# Patient Record
Sex: FEMALE | Race: WHITE | Employment: FULL TIME | URBAN - METROPOLITAN AREA
[De-identification: names, ages, dates, MRNs, and addresses within clinical notes are randomized per-mention and may not be internally consistent; named-entity substitution may affect disease eponyms.]

---

## 2019-07-09 ENCOUNTER — HOSPITAL ENCOUNTER (OUTPATIENT)
Age: 34
Setting detail: SPECIMEN
Discharge: HOME | End: 2019-07-09
Payer: COMMERCIAL

## 2019-07-09 ENCOUNTER — OFFICE VISIT (OUTPATIENT)
Dept: OBGYN CLINIC | Age: 34
End: 2019-07-09
Payer: COMMERCIAL

## 2019-07-09 VITALS
WEIGHT: 232 LBS | HEIGHT: 67 IN | BODY MASS INDEX: 36.41 KG/M2 | DIASTOLIC BLOOD PRESSURE: 78 MMHG | SYSTOLIC BLOOD PRESSURE: 122 MMHG

## 2019-07-09 DIAGNOSIS — Z11.51 SCREENING FOR HPV (HUMAN PAPILLOMAVIRUS): ICD-10-CM

## 2019-07-09 DIAGNOSIS — Z11.3 SCREENING FOR STD (SEXUALLY TRANSMITTED DISEASE): ICD-10-CM

## 2019-07-09 DIAGNOSIS — Z98.890 H/O LEEP: ICD-10-CM

## 2019-07-09 DIAGNOSIS — Z01.419 WELL FEMALE EXAM WITH ROUTINE GYNECOLOGICAL EXAM: Primary | ICD-10-CM

## 2019-07-09 DIAGNOSIS — Z01.419 WELL FEMALE EXAM WITH ROUTINE GYNECOLOGICAL EXAM: ICD-10-CM

## 2019-07-09 LAB
DIRECT EXAM: NORMAL
Lab: NORMAL
SPECIMEN DESCRIPTION: NORMAL

## 2019-07-09 PROCEDURE — 87491 CHLMYD TRACH DNA AMP PROBE: CPT

## 2019-07-09 PROCEDURE — 99385 PREV VISIT NEW AGE 18-39: CPT | Performed by: NURSE PRACTITIONER

## 2019-07-09 PROCEDURE — 87591 N.GONORRHOEAE DNA AMP PROB: CPT

## 2019-07-09 PROCEDURE — G0145 SCR C/V CYTO,THINLAYER,RESCR: HCPCS

## 2019-07-09 PROCEDURE — 87480 CANDIDA DNA DIR PROBE: CPT

## 2019-07-09 PROCEDURE — 87510 GARDNER VAG DNA DIR PROBE: CPT

## 2019-07-09 PROCEDURE — 87624 HPV HI-RISK TYP POOLED RSLT: CPT

## 2019-07-09 PROCEDURE — 87660 TRICHOMONAS VAGIN DIR PROBE: CPT

## 2019-07-09 ASSESSMENT — PATIENT HEALTH QUESTIONNAIRE - PHQ9
SUM OF ALL RESPONSES TO PHQ QUESTIONS 1-9: 0
SUM OF ALL RESPONSES TO PHQ QUESTIONS 1-9: 0
1. LITTLE INTEREST OR PLEASURE IN DOING THINGS: 0
SUM OF ALL RESPONSES TO PHQ9 QUESTIONS 1 & 2: 0
2. FEELING DOWN, DEPRESSED OR HOPELESS: 0

## 2019-07-09 NOTE — PROGRESS NOTES
History and Physical  [address], [address] [facility] [address] [place] [address]. [phone]   Fax [phone]  Jana Duverney  2019              35 y.o. Chief Complaint   Patient presents with    Gynecologic Exam       Patient's last menstrual period was 2019. Primary Care Physician: Leland Bland    The patient was seen and examined. She has no chief complaint today and is here for her annual exam. Patient desires STD check- found out  was unfaithful. No longer with . Denies burning, vaginal discharge and odor. Patient declines herpes profile. Tested positive in the past.  Her bowels are regular. There are no voiding complaints. She denies any bloating. She denies vaginal discharge and was counseled on STD's and the need for barrier contraception. HPI : Jana Duverney is a 35 y.o. female [id number]    Annual exam  STD check  ________________________________________________________________________  OB History    Para Term  AB Living   3 3 3 0 0 3   SAB TAB Ectopic Molar Multiple Live Births   0 0 0 0 0 3      # Outcome Date GA Lbr Ron/2nd Weight Sex Delivery Anes PTL Lv   3 Term 14 37w3d  6 lb 11.6 oz (3.05 kg) M Vag-Spont  N ELISE      Birth Comments: Education information given to mother and she verbalizes understanding about the following:      Infant security. Patient safety. Skin to Skin Contact for You and Your Baby. Hour for family beginnings. Benefits of breastfeeding.     What do the expe      Name: Tiny   2 Term 13 38w0d  7 lb 15 oz (3.6 kg) F Vag-Spont  N ELISE      Name: Bryant Giraldon: 9  Apgar5: 9   1 Term 08 40w0d  7 lb 8.3 oz (3.41 kg) M Vag-Spont EPI N ELISE      Name: Dione Card: 9  Apgar5: 9     Past Medical History:   Diagnosis Date    Anemia 2013    Depression Presently on Prozac 2013    HSV (herpes simplex virus) infection     HSV (herpes simplex virus) infection     Obesity complicating pregnancy, childbirth, or the puerperium, antepartum condition or [id number] [id number]                                                                   Past Surgical History:   Procedure Laterality Date    ANTERIOR CRUCIATE LIGAMENT REPAIR  2008    LT REPLACED    KNEE ARTHROSCOPY      WISDOM TOOTH EXTRACTION       Family History   Problem Relation Age of Onset    Uterine Cancer Maternal Grandmother     Hypertension Father     Asthma Father     Heart Attack Maternal Grandfather     Breast Cancer Neg Hx     Cancer Neg Hx     Colon Cancer Neg Hx     Diabetes Neg Hx     Eclampsia Neg Hx     Ovarian Cancer Neg Hx      Labor Neg Hx     Spont Abortions Neg Hx     Stroke Neg Hx      Social History     Socioeconomic History    Marital status:      Spouse name: Not on file    Number of children: Not on file    Years of education: Not on file    Highest education level: Not on file   Occupational History    Not on file   Social Needs    Financial resource strain: Not on file    Food insecurity:     Worry: Not on file     Inability: Not on file    Transportation needs:     Medical: Not on file     Non-medical: Not on file   Tobacco Use    Smoking status: Current Every Day Smoker    Smokeless tobacco: Never Used   Substance and Sexual Activity    Alcohol use:  Yes    Drug use: No    Sexual activity: Yes     Partners: Male   Lifestyle    Physical activity:     Days per week: Not on file     Minutes per session: Not on file    Stress: Not on file   Relationships    Social connections:     Talks on phone: Not on file     Gets together: Not on file     Attends Scientologist service: Not on file     Active member of club or organization: Not on file     Attends meetings of clubs or organizations: Not on file     Relationship status: Not on file    Intimate partner violence:     Fear of current or ex partner: Not on file     Emotionally abused: Not on file     Physically abused: Not on file     Forced sexual activity: Not on file   Other Topics Concern    Not on file   Social History Narrative    Not on file       MEDICATIONS:  Current Outpatient Medications   Medication Sig Dispense Refill    omeprazole (PRILOSEC) 20 MG capsule       Multiple Vitamin (MULTI VITAMIN DAILY PO) Take by mouth      b complex vitamins capsule Take 1 capsule by mouth daily      LORazepam (ATIVAN) 1 MG tablet Take 1 tablet by mouth every 8 hours as needed for Anxiety 10 tablet 0    DiphenhydrAMINE HCl (BENADRYL PO) Take  by mouth.  amphetamine-dextroamphetamine (ADDERALL) 20 MG tablet       Cholecalciferol (VITAMIN D) 2000 UNITS CAPS capsule Take by mouth      fexofenadine (ALLEGRA) 60 MG tablet Take 60 mg by mouth daily      norethindrone-ethinyl estradiol-iron (LOESTRIN FE 1.5/30) 1.5-30 MG-MCG tablet Take 1 tablet by mouth daily 1 packet 12    FLUoxetine (PROZAC) 20 MG capsule Take 40 mg by mouth daily. No current facility-administered medications for this visit. ALLERGIES:  Allergies as of 07/09/2019    (No Known Allergies)       Symptoms of decreased mood absent  Symptoms of anhedonia absent    **If either question is answered in a  positive fashion then complete the PHQ9 Scoring Evaluation and make the appropriate referral**      Immunization status: stated as current, but no records available. Gynecologic History:  Menarche: 5 yo  Menopause at [address] [facility] yo     Patient's last menstrual period was 06/18/2019. Sexually Active: Yes    STD History: Yes hx herpes      Permanent Sterilization: No   Reversible Birth Control: No        Hormone Replacement Exposure: No      Genetic Qualified Family History of Breast, Ovarian , Colon or Uterine Cancer: no     If YES see scanned worksheet.     Preventative Health Testing:    Health Maintenance:  Health Maintenance Due   Topic Date Due    Pneumococcal 0-64 years Vaccine (1 of 1 - PPSV23) Future     Standing Expiration Date:   8/8/2019    HIV Screen     Standing Status:   Future     Standing Expiration Date:   7/9/2020    Hepatitis C Antibody     Standing Status:   Future     Standing Expiration Date:   7/9/2020

## 2019-07-10 LAB
C TRACH DNA GENITAL QL NAA+PROBE: NEGATIVE
N. GONORRHOEAE DNA: NEGATIVE
SPECIMEN DESCRIPTION: NORMAL

## 2019-07-11 LAB
CYTOLOGY REPORT: NORMAL
HPV SAMPLE: NORMAL
HPV, GENOTYPE 16: NOT DETECTED
HPV, GENOTYPE 18: NOT DETECTED
HPV, HIGH RISK OTHER: NOT DETECTED
HPV, INTERPRETATION: NORMAL
SPECIMEN DESCRIPTION: NORMAL

## 2019-11-26 ENCOUNTER — TELEPHONE (OUTPATIENT)
Dept: OBGYN CLINIC | Age: 34
End: 2019-11-26

## 2019-11-26 DIAGNOSIS — R10.2 PELVIC PAIN IN FEMALE: Primary | ICD-10-CM

## 2019-11-26 RX ORDER — VALACYCLOVIR HYDROCHLORIDE 500 MG/1
500 TABLET, FILM COATED ORAL 2 TIMES DAILY
Qty: 30 TABLET | Refills: 0 | Status: SHIPPED | OUTPATIENT
Start: 2019-11-26 | End: 2019-11-29

## 2019-11-27 ENCOUNTER — OFFICE VISIT (OUTPATIENT)
Dept: OBGYN CLINIC | Age: 34
End: 2019-11-27
Payer: COMMERCIAL

## 2019-11-27 DIAGNOSIS — R10.2 PELVIC PAIN IN FEMALE: ICD-10-CM

## 2019-11-27 PROCEDURE — 76856 US EXAM PELVIC COMPLETE: CPT | Performed by: OBSTETRICS & GYNECOLOGY

## 2019-11-27 PROCEDURE — 76830 TRANSVAGINAL US NON-OB: CPT | Performed by: OBSTETRICS & GYNECOLOGY

## 2019-12-02 ENCOUNTER — TELEPHONE (OUTPATIENT)
Dept: OBGYN CLINIC | Age: 34
End: 2019-12-02

## 2019-12-02 DIAGNOSIS — N83.202 LEFT OVARIAN CYST: Primary | ICD-10-CM

## 2021-09-03 ENCOUNTER — HOSPITAL ENCOUNTER (OUTPATIENT)
Age: 36
Setting detail: SPECIMEN
Discharge: HOME | End: 2021-09-03
Payer: COMMERCIAL

## 2021-09-03 LAB
ABSOLUTE EOS #: 0.19 K/UL (ref 0–0.44)
ABSOLUTE IMMATURE GRANULOCYTE: <0.03 K/UL (ref 0–0.3)
ABSOLUTE LYMPH #: 2.4 K/UL (ref 1.1–3.7)
ABSOLUTE MONO #: 0.69 K/UL (ref 0.1–1.2)
ALBUMIN SERPL-MCNC: 4.6 G/DL (ref 3.5–5.2)
ALBUMIN/GLOBULIN RATIO: 1.6 (ref 1–2.5)
ALP BLD-CCNC: 66 U/L (ref 35–104)
ALT SERPL-CCNC: 30 U/L (ref 5–33)
ANION GAP SERPL CALCULATED.3IONS-SCNC: 12 MMOL/L (ref 9–17)
AST SERPL-CCNC: 11 U/L
BASOPHILS # BLD: 0 % (ref 0–2)
BASOPHILS ABSOLUTE: 0.03 K/UL (ref 0–0.2)
BILIRUB SERPL-MCNC: 0.6 MG/DL (ref 0.3–1.2)
BUN BLDV-MCNC: 12 MG/DL (ref 6–20)
BUN/CREAT BLD: ABNORMAL (ref 9–20)
CALCIUM SERPL-MCNC: 9.4 MG/DL (ref 8.6–10.4)
CHLORIDE BLD-SCNC: 103 MMOL/L (ref 98–107)
CHOLESTEROL, FASTING: 173 MG/DL
CHOLESTEROL/HDL RATIO: 3.6
CO2: 23 MMOL/L (ref 20–31)
CREAT SERPL-MCNC: 0.65 MG/DL (ref 0.5–0.9)
DIFFERENTIAL TYPE: NORMAL
EOSINOPHILS RELATIVE PERCENT: 2 % (ref 1–4)
GFR AFRICAN AMERICAN: >60 ML/MIN
GFR NON-AFRICAN AMERICAN: >60 ML/MIN
GFR SERPL CREATININE-BSD FRML MDRD: ABNORMAL ML/MIN/{1.73_M2}
GFR SERPL CREATININE-BSD FRML MDRD: ABNORMAL ML/MIN/{1.73_M2}
GLUCOSE BLD-MCNC: 103 MG/DL (ref 70–99)
HCT VFR BLD CALC: 41.3 % (ref 36.3–47.1)
HDLC SERPL-MCNC: 48 MG/DL
HEMOGLOBIN: 13.6 G/DL (ref 11.9–15.1)
IMMATURE GRANULOCYTES: 0 %
LDL CHOLESTEROL: 107 MG/DL (ref 0–130)
LYMPHOCYTES # BLD: 29 % (ref 24–43)
MCH RBC QN AUTO: 30.2 PG (ref 25.2–33.5)
MCHC RBC AUTO-ENTMCNC: 32.9 G/DL (ref 28.4–34.8)
MCV RBC AUTO: 91.8 FL (ref 82.6–102.9)
MONOCYTES # BLD: 8 % (ref 3–12)
NRBC AUTOMATED: 0 PER 100 WBC
PDW BLD-RTO: 12.6 % (ref 11.8–14.4)
PLATELET # BLD: 391 K/UL (ref 138–453)
PLATELET ESTIMATE: NORMAL
PMV BLD AUTO: 9.2 FL (ref 8.1–13.5)
POTASSIUM SERPL-SCNC: 4.1 MMOL/L (ref 3.7–5.3)
RBC # BLD: 4.5 M/UL (ref 3.95–5.11)
RBC # BLD: NORMAL 10*6/UL
SEG NEUTROPHILS: 61 % (ref 36–65)
SEGMENTED NEUTROPHILS ABSOLUTE COUNT: 4.85 K/UL (ref 1.5–8.1)
SODIUM BLD-SCNC: 138 MMOL/L (ref 135–144)
THYROXINE, FREE: 1.22 NG/DL (ref 0.93–1.7)
TOTAL PROTEIN: 7.5 G/DL (ref 6.4–8.3)
TRIGLYCERIDE, FASTING: 88 MG/DL
TSH SERPL DL<=0.05 MIU/L-ACNC: 1.67 MIU/L (ref 0.3–5)
VLDLC SERPL CALC-MCNC: NORMAL MG/DL (ref 1–30)
WBC # BLD: 8.2 K/UL (ref 3.5–11.3)
WBC # BLD: NORMAL 10*3/UL

## 2023-05-13 ENCOUNTER — APPOINTMENT (OUTPATIENT)
Dept: GENERAL RADIOLOGY | Age: 38
End: 2023-05-13
Payer: COMMERCIAL

## 2023-05-13 ENCOUNTER — HOSPITAL ENCOUNTER (EMERGENCY)
Age: 38
Discharge: HOME | End: 2023-05-13
Attending: STUDENT IN AN ORGANIZED HEALTH CARE EDUCATION/TRAINING PROGRAM
Payer: COMMERCIAL

## 2023-05-13 VITALS
OXYGEN SATURATION: 98 % | HEART RATE: 84 BPM | RESPIRATION RATE: 17 BRPM | DIASTOLIC BLOOD PRESSURE: 100 MMHG | BODY MASS INDEX: 36.1 KG/M2 | SYSTOLIC BLOOD PRESSURE: 138 MMHG | HEIGHT: 67 IN | WEIGHT: 230 LBS | TEMPERATURE: 98.1 F

## 2023-05-13 DIAGNOSIS — M25.462 KNEE EFFUSION, LEFT: Primary | ICD-10-CM

## 2023-05-13 PROCEDURE — 6370000000 HC RX 637 (ALT 250 FOR IP): Performed by: STUDENT IN AN ORGANIZED HEALTH CARE EDUCATION/TRAINING PROGRAM

## 2023-05-13 PROCEDURE — 73564 X-RAY EXAM KNEE 4 OR MORE: CPT

## 2023-05-13 PROCEDURE — 20610 DRAIN/INJ JOINT/BURSA W/O US: CPT

## 2023-05-13 PROCEDURE — 99283 EMERGENCY DEPT VISIT LOW MDM: CPT

## 2023-05-13 PROCEDURE — 6360000002 HC RX W HCPCS: Performed by: STUDENT IN AN ORGANIZED HEALTH CARE EDUCATION/TRAINING PROGRAM

## 2023-05-13 RX ORDER — ROPIVACAINE HYDROCHLORIDE 5 MG/ML
5 INJECTION, SOLUTION EPIDURAL; INFILTRATION; PERINEURAL ONCE
Status: COMPLETED | OUTPATIENT
Start: 2023-05-13 | End: 2023-05-13

## 2023-05-13 RX ORDER — OXYCODONE HYDROCHLORIDE 5 MG/1
5 TABLET ORAL ONCE
Status: COMPLETED | OUTPATIENT
Start: 2023-05-13 | End: 2023-05-13

## 2023-05-13 RX ORDER — TRIAMCINOLONE ACETONIDE 40 MG/ML
40 INJECTION, SUSPENSION INTRA-ARTICULAR; INTRAMUSCULAR ONCE
Status: COMPLETED | OUTPATIENT
Start: 2023-05-13 | End: 2023-05-13

## 2023-05-13 RX ORDER — OXYCODONE HYDROCHLORIDE 5 MG/1
5 TABLET ORAL EVERY 12 HOURS PRN
Qty: 6 TABLET | Refills: 0 | Status: SHIPPED | OUTPATIENT
Start: 2023-05-13 | End: 2023-05-16

## 2023-05-13 RX ADMIN — ROPIVACAINE HYDROCHLORIDE 5 ML: 5 INJECTION, SOLUTION EPIDURAL; INFILTRATION; PERINEURAL at 10:01

## 2023-05-13 RX ADMIN — TRIAMCINOLONE ACETONIDE 40 MG: 40 INJECTION, SUSPENSION INTRA-ARTICULAR; INTRAMUSCULAR at 10:01

## 2023-05-13 RX ADMIN — OXYCODONE HYDROCHLORIDE 5 MG: 5 TABLET ORAL at 08:13

## 2023-05-13 ASSESSMENT — PAIN DESCRIPTION - DESCRIPTORS: DESCRIPTORS: CRAMPING

## 2023-05-13 ASSESSMENT — ENCOUNTER SYMPTOMS
RHINORRHEA: 0
ABDOMINAL PAIN: 0
SHORTNESS OF BREATH: 0
VOMITING: 0
NAUSEA: 0
COUGH: 0

## 2023-05-13 ASSESSMENT — PAIN SCALES - GENERAL
PAINLEVEL_OUTOF10: 8
PAINLEVEL_OUTOF10: 8

## 2023-05-13 ASSESSMENT — PAIN - FUNCTIONAL ASSESSMENT: PAIN_FUNCTIONAL_ASSESSMENT: 0-10

## 2023-05-13 ASSESSMENT — LIFESTYLE VARIABLES
HOW MANY STANDARD DRINKS CONTAINING ALCOHOL DO YOU HAVE ON A TYPICAL DAY: 1 OR 2
HOW OFTEN DO YOU HAVE A DRINK CONTAINING ALCOHOL: 2-3 TIMES A WEEK

## 2023-05-13 ASSESSMENT — PAIN DESCRIPTION - LOCATION: LOCATION: KNEE

## 2023-05-13 ASSESSMENT — PAIN DESCRIPTION - ORIENTATION: ORIENTATION: LEFT

## 2023-05-13 NOTE — ED NOTES
Crutches fitted and applied to patient with patient noting history of crutch use. Left knee wrapped with ACE wrap with PMS noted to exposed distal extremity. Patient verbalized benefit post application of ACE wrap. Discharge instructions reviewed with patient, noting all directions and education by provider. Patient verbalizes understanding of all information reviewed, gathered personal items, and transferred under own power off unit to lobby without incident.       Sanjeev Rocha RN  05/13/23 Minor Kebede RN  05/13/23 [id number]

## 2023-05-13 NOTE — ED PROVIDER NOTES
discharged      PROCEDURES:  Musculoskeletal ULTRASOUND:   A limited bedside, MSK ultrasound of the LEFT KNEE was performed. The purpose of this study was to evaluate for effusion, muscular injury, and bony abnormality. The structures studied was the musculoskeletal system and underlying soft tissue. Effusion Present:Yes  Muscular Injury: Indetermiante  Bony Abnormality: No  The study was technically adequate. IMAGES:  Electronically uploaded to the PACS system    PROCEDURE NOTE - ARTHROCENTESIS    PATIENT NAME: [id number] RECORD NO. [id number]  DATE: 5/13/2023  ATTENDING PHYSICIAN: Arun    PREOPERATIVE DIAGNOSIS:  Symptomatic relief of large effusion and steroid/anesthetic injection of the left knee   POSTOPERATIVE DIAGNOSIS:  Same  PROCEDURE PERFORMED:  Arthrocentesis of the left knee   PERFORMING PHYSICIAN: Wendy Wilder DO      DISCUSSION:  Suanne Barthel is a 40y.o.-year-old female who requires an arthrocentesis of the  left knee for Symptomatic relief of large effusion. The history and physical examination were reviewed and confirmed. CONSENT: The patient provided verbal consent for this procedure. PROCEDURE: The left knee was positioned appropriately and the landmarks were identified. Local anesthesia was obtained by infiltration using 1% Lidocaine without epinephrine. The area was then prepped and draped in the usual sterile fashion. A needle was then introduced into the joint space at which point 35 cc of serosanguinous fluid was aspirated. A joint injection was performed using 1.0 cc of Kenalog 40 with 4.0 cc of 0.5% ropivacaine without epinephrine. The aspirated fluid was discarded. .  A sterile dressing was then applied to the site. The patient tolerated the procedure well.      COMPLICATIONS: None     Wendy Wilder DO  12:19 PM, 5/13/23      CONSULTS:  None    CRITICAL CARE:  There was a high probability of clinically significant/life threatening

## 2023-05-13 NOTE — DISCHARGE INSTRUCTIONS
Please help with your family doctor soon as possible.   You will likely require an MRI of your left knee  You may take Tylenol up to 1 g every 8 hours as needed for pain and Motrin up to 600 mg every 8 hours as needed for pain  If those medications do not help you, you may take 1 oxycodone every 12 hours as prescribed  You may also apply ice to your left knee, 20 to 30 minutes at a time, 4-5 times a day  Please utilize the crutches whenever you are walking  Should you have any severe, worsening, unrelenting, or new injury, please return to the emergency room soon as possible

## 2023-10-17 ENCOUNTER — HOSPITAL ENCOUNTER (OUTPATIENT)
Age: 38
Discharge: HOME | End: 2023-10-19
Payer: COMMERCIAL

## 2023-10-17 ENCOUNTER — HOSPITAL ENCOUNTER (OUTPATIENT)
Dept: GENERAL RADIOLOGY | Age: 38
Discharge: HOME | End: 2023-10-19
Payer: COMMERCIAL

## 2023-10-17 DIAGNOSIS — M25.571 ACUTE RIGHT ANKLE PAIN: ICD-10-CM

## 2023-10-17 PROCEDURE — 73610 X-RAY EXAM OF ANKLE: CPT
